# Patient Record
Sex: FEMALE | Race: ASIAN | NOT HISPANIC OR LATINO | URBAN - METROPOLITAN AREA
[De-identification: names, ages, dates, MRNs, and addresses within clinical notes are randomized per-mention and may not be internally consistent; named-entity substitution may affect disease eponyms.]

---

## 2024-09-17 ENCOUNTER — INPATIENT (INPATIENT)
Facility: HOSPITAL | Age: 64
LOS: 0 days | Discharge: HOME | DRG: 392 | End: 2024-09-18
Attending: STUDENT IN AN ORGANIZED HEALTH CARE EDUCATION/TRAINING PROGRAM | Admitting: STUDENT IN AN ORGANIZED HEALTH CARE EDUCATION/TRAINING PROGRAM
Payer: COMMERCIAL

## 2024-09-17 VITALS
HEART RATE: 81 BPM | TEMPERATURE: 98 F | DIASTOLIC BLOOD PRESSURE: 69 MMHG | WEIGHT: 149.91 LBS | SYSTOLIC BLOOD PRESSURE: 111 MMHG | RESPIRATION RATE: 18 BRPM | OXYGEN SATURATION: 94 %

## 2024-09-17 DIAGNOSIS — Z98.890 OTHER SPECIFIED POSTPROCEDURAL STATES: Chronic | ICD-10-CM

## 2024-09-17 DIAGNOSIS — E89.0 POSTPROCEDURAL HYPOTHYROIDISM: Chronic | ICD-10-CM

## 2024-09-17 DIAGNOSIS — Z90.49 ACQUIRED ABSENCE OF OTHER SPECIFIED PARTS OF DIGESTIVE TRACT: Chronic | ICD-10-CM

## 2024-09-17 LAB
ALBUMIN SERPL ELPH-MCNC: 3.7 G/DL — SIGNIFICANT CHANGE UP (ref 3.4–5)
ALP SERPL-CCNC: 57 U/L — SIGNIFICANT CHANGE UP (ref 40–120)
ALT FLD-CCNC: 26 U/L — SIGNIFICANT CHANGE UP (ref 12–42)
ANION GAP SERPL CALC-SCNC: 9 MMOL/L — SIGNIFICANT CHANGE UP (ref 9–16)
AST SERPL-CCNC: 13 U/L — LOW (ref 15–37)
BASOPHILS # BLD AUTO: 0.04 K/UL — SIGNIFICANT CHANGE UP (ref 0–0.2)
BASOPHILS NFR BLD AUTO: 0.3 % — SIGNIFICANT CHANGE UP (ref 0–2)
BILIRUB SERPL-MCNC: 0.4 MG/DL — SIGNIFICANT CHANGE UP (ref 0.2–1.2)
BUN SERPL-MCNC: 20 MG/DL — SIGNIFICANT CHANGE UP (ref 7–23)
CALCIUM SERPL-MCNC: 8.5 MG/DL — SIGNIFICANT CHANGE UP (ref 8.5–10.5)
CHLORIDE SERPL-SCNC: 106 MMOL/L — SIGNIFICANT CHANGE UP (ref 96–108)
CO2 SERPL-SCNC: 26 MMOL/L — SIGNIFICANT CHANGE UP (ref 22–31)
CREAT SERPL-MCNC: 0.64 MG/DL — SIGNIFICANT CHANGE UP (ref 0.5–1.3)
EGFR: 98 ML/MIN/1.73M2 — SIGNIFICANT CHANGE UP
EOSINOPHIL # BLD AUTO: 0.02 K/UL — SIGNIFICANT CHANGE UP (ref 0–0.5)
EOSINOPHIL NFR BLD AUTO: 0.2 % — SIGNIFICANT CHANGE UP (ref 0–6)
GLUCOSE SERPL-MCNC: 137 MG/DL — HIGH (ref 70–99)
HCT VFR BLD CALC: 45.3 % — HIGH (ref 34.5–45)
HGB BLD-MCNC: 15 G/DL — SIGNIFICANT CHANGE UP (ref 11.5–15.5)
IMM GRANULOCYTES NFR BLD AUTO: 0.5 % — SIGNIFICANT CHANGE UP (ref 0–0.9)
LIDOCAIN IGE QN: 29 U/L — SIGNIFICANT CHANGE UP (ref 16–77)
LYMPHOCYTES # BLD AUTO: 1.07 K/UL — SIGNIFICANT CHANGE UP (ref 1–3.3)
LYMPHOCYTES # BLD AUTO: 8.1 % — LOW (ref 13–44)
MCHC RBC-ENTMCNC: 30.5 PG — SIGNIFICANT CHANGE UP (ref 27–34)
MCHC RBC-ENTMCNC: 33.1 GM/DL — SIGNIFICANT CHANGE UP (ref 32–36)
MCV RBC AUTO: 92.3 FL — SIGNIFICANT CHANGE UP (ref 80–100)
MONOCYTES # BLD AUTO: 0.37 K/UL — SIGNIFICANT CHANGE UP (ref 0–0.9)
MONOCYTES NFR BLD AUTO: 2.8 % — SIGNIFICANT CHANGE UP (ref 2–14)
NEUTROPHILS # BLD AUTO: 11.72 K/UL — HIGH (ref 1.8–7.4)
NEUTROPHILS NFR BLD AUTO: 88.1 % — HIGH (ref 43–77)
NRBC # BLD: 0 /100 WBCS — SIGNIFICANT CHANGE UP (ref 0–0)
PLATELET # BLD AUTO: 176 K/UL — SIGNIFICANT CHANGE UP (ref 150–400)
POTASSIUM SERPL-MCNC: 4.3 MMOL/L — SIGNIFICANT CHANGE UP (ref 3.5–5.3)
POTASSIUM SERPL-SCNC: 4.3 MMOL/L — SIGNIFICANT CHANGE UP (ref 3.5–5.3)
PROT SERPL-MCNC: 7.1 G/DL — SIGNIFICANT CHANGE UP (ref 6.4–8.2)
RBC # BLD: 4.91 M/UL — SIGNIFICANT CHANGE UP (ref 3.8–5.2)
RBC # FLD: 11.5 % — SIGNIFICANT CHANGE UP (ref 10.3–14.5)
SODIUM SERPL-SCNC: 141 MMOL/L — SIGNIFICANT CHANGE UP (ref 132–145)
TROPONIN I, HIGH SENSITIVITY RESULT: <4 NG/L — SIGNIFICANT CHANGE UP
WBC # BLD: 13.29 K/UL — HIGH (ref 3.8–10.5)
WBC # FLD AUTO: 13.29 K/UL — HIGH (ref 3.8–10.5)

## 2024-09-17 PROCEDURE — 70450 CT HEAD/BRAIN W/O DYE: CPT | Mod: 26,MC

## 2024-09-17 PROCEDURE — 74177 CT ABD & PELVIS W/CONTRAST: CPT | Mod: 26,MC

## 2024-09-17 PROCEDURE — 99285 EMERGENCY DEPT VISIT HI MDM: CPT

## 2024-09-17 PROCEDURE — 73030 X-RAY EXAM OF SHOULDER: CPT | Mod: 26,RT

## 2024-09-17 PROCEDURE — 71045 X-RAY EXAM CHEST 1 VIEW: CPT | Mod: 26

## 2024-09-17 RX ORDER — METRONIDAZOLE 250 MG
500 TABLET ORAL ONCE
Refills: 0 | Status: COMPLETED | OUTPATIENT
Start: 2024-09-17 | End: 2024-09-17

## 2024-09-17 RX ORDER — ACETAMINOPHEN 325 MG/1
650 TABLET ORAL ONCE
Refills: 0 | Status: COMPLETED | OUTPATIENT
Start: 2024-09-17 | End: 2024-09-17

## 2024-09-17 RX ORDER — SODIUM CHLORIDE 9 MG/ML
1000 INJECTION INTRAMUSCULAR; INTRAVENOUS; SUBCUTANEOUS ONCE
Refills: 0 | Status: COMPLETED | OUTPATIENT
Start: 2024-09-17 | End: 2024-09-17

## 2024-09-17 RX ORDER — KETOROLAC TROMETHAMINE 30 MG/ML
15 INJECTION, SOLUTION INTRAMUSCULAR ONCE
Refills: 0 | Status: DISCONTINUED | OUTPATIENT
Start: 2024-09-17 | End: 2024-09-17

## 2024-09-17 RX ORDER — ONDANSETRON 2 MG/ML
4 INJECTION, SOLUTION INTRAMUSCULAR; INTRAVENOUS ONCE
Refills: 0 | Status: COMPLETED | OUTPATIENT
Start: 2024-09-17 | End: 2024-09-17

## 2024-09-17 RX ORDER — CEFPODOXIME PROXETIL 100 MG/5ML
1 GRANULE, FOR SUSPENSION ORAL
Qty: 14 | Refills: 0
Start: 2024-09-17 | End: 2024-09-23

## 2024-09-17 RX ORDER — SODIUM CHLORIDE 9 MG/ML
1000 INJECTION INTRAMUSCULAR; INTRAVENOUS; SUBCUTANEOUS
Refills: 0 | Status: DISCONTINUED | OUTPATIENT
Start: 2024-09-17 | End: 2024-09-18

## 2024-09-17 RX ORDER — METRONIDAZOLE 250 MG
1 TABLET ORAL
Qty: 21 | Refills: 0
Start: 2024-09-17 | End: 2024-09-23

## 2024-09-17 RX ORDER — ACETAMINOPHEN 325 MG/1
2 TABLET ORAL
Qty: 40 | Refills: 0
Start: 2024-09-17

## 2024-09-17 RX ADMIN — SODIUM CHLORIDE 1000 MILLILITER(S): 9 INJECTION INTRAMUSCULAR; INTRAVENOUS; SUBCUTANEOUS at 14:51

## 2024-09-17 RX ADMIN — SODIUM CHLORIDE 1000 MILLILITER(S): 9 INJECTION INTRAMUSCULAR; INTRAVENOUS; SUBCUTANEOUS at 17:48

## 2024-09-17 RX ADMIN — Medication 4 MILLIGRAM(S): at 14:52

## 2024-09-17 RX ADMIN — Medication 100 MILLIGRAM(S): at 17:48

## 2024-09-17 RX ADMIN — KETOROLAC TROMETHAMINE 15 MILLIGRAM(S): 30 INJECTION, SOLUTION INTRAMUSCULAR at 21:06

## 2024-09-17 RX ADMIN — SODIUM CHLORIDE 150 MILLILITER(S): 9 INJECTION INTRAMUSCULAR; INTRAVENOUS; SUBCUTANEOUS at 21:05

## 2024-09-17 RX ADMIN — ONDANSETRON 4 MILLIGRAM(S): 2 INJECTION, SOLUTION INTRAMUSCULAR; INTRAVENOUS at 20:30

## 2024-09-17 RX ADMIN — Medication 100 MILLIGRAM(S): at 18:11

## 2024-09-17 NOTE — ED PROVIDER NOTE - OBJECTIVE STATEMENT
She reports abdominal pain, nausea, vomiting since this morning.  Pain is worse in the left lower quadrant.  Vomit is nonbloody nonbilious.  Diarrhea is nonbloody.  Pain became severe before she called 911 and she felt like he was going to pass out.  Also having a right-sided headache since the vomiting started.  Also has chronic pain in her right shoulder that has been worse for the past few weeks and worse today.  No fever, chest pain, shortness of breath, dysuria, focal weakness, paresthesias, injury.

## 2024-09-17 NOTE — ED PROVIDER NOTE - PROGRESS NOTE DETAILS
Patient resting comfortably, feels markedly improved. TOlerated PO. Abdominal pain much better. Shoulder still hurts. WIll give tyelnol. To f/u with PMD in 1-2 days, Return to the ED immediately if getting worse, not improving, or if having any new or troubling symptoms. Patient resting comfortably, feels mildly improved. Possible colitis seen on CT fits clinical picture. will tx with antibiotics. After taking tylenol, patient started vomiting. Not safe to discharge since she cannot tolerate pills. Will admit for IV antibiotics. After taking tylenol, patient started vomiting. Not safe to discharge since she cannot tolerate pills. Will admit for IV antibiotics. I consulted with Dr. De Luna, hospitalist attending, who accepted patient to [facility] medicine.
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
## 2024-09-17 NOTE — ED PROVIDER NOTE - PATIENT PORTAL LINK FT
You can access the FollowMyHealth Patient Portal offered by [facility] by registering at the following website: [email or web address]. By joining [employer]’s FollowMyHealth portal, you will also be able to view your health information using other applications (apps) compatible with our system.

## 2024-09-17 NOTE — ED PROVIDER NOTE - CLINICAL SUMMARY MEDICAL DECISION MAKING FREE TEXT BOX
Patient With abdominal pain, nausea, vomiting and diarrhea.  Also with chronic right shoulder pain that is worse today.  Also headache today. Will get labs, CT head, CT abdomen pelvis, shoulder x-ray, reassess.

## 2024-09-17 NOTE — ED PROVIDER NOTE - NSICDXPASTSURGICALHX_GEN_ALL_CORE_FT
PAST SURGICAL HISTORY:  S/P appendectomy     S/P lumpectomy, right breast     S/P partial thyroidectomy

## 2024-09-17 NOTE — ED ADULT NURSE NOTE - NSFALLRISKINTERV_ED_ALL_ED
Assistance OOB with selected safe patient handling equipment if applicable/Assistance with ambulation/Communicate fall risk and risk factors to all staff, patient, and family/Encourage patient to sit up slowly, dangle for a short time, stand at bedside before walking/Orthostatic vital signs/Provide visual cue: yellow wristband, yellow gown, etc/Reinforce activity limits and safety measures with patient and family/Review medications for side effects contributing to fall risk/Toileting schedule using arm’s reach rule for commode and bathroom/Call bell, personal items and telephone in reach/Instruct patient to call for assistance before getting out of bed/chair/stretcher/Non-slip footwear applied when patient is off stretcher/[place] to call system/Physically safe environment - no spills, clutter or unnecessary equipment/Purposeful Proactive Rounding/Room/bathroom lighting operational, light cord in reach

## 2024-09-17 NOTE — ED ADULT NURSE NOTE - OBJECTIVE STATEMENT
Patient reports lower ab pain associated with n/v and diarrhea. Also c/o left upper back pain and HA. REsting on left side because increased pain when she lies flat

## 2024-09-17 NOTE — ED PROVIDER NOTE - PHYSICAL EXAMINATION
Gen: Mild distress due to pain. HEENT: NCAT, mmm   Chest: RRR, nl S1 and S2, no m/r/g. Resp: CTAB, no w/r/r  Abd: nl BS, soft, Mildly diffusely tender, moderate left lower quadrant tenderness, no G/R. Nondistended. Ext: Warm, dry. Right shoulder anterior tenderness.  Full range of motion.  No effusion.  Neuro: CN II-XII intact, normal and equal strength, sensation, and reflexes bilaterally, speech clear  Psych: AAOx3

## 2024-09-17 NOTE — ED PROVIDER NOTE - NSICDXPASTMEDICALHX_GEN_ALL_CORE_FT
PAST MEDICAL HISTORY:  Breast cancer s/p right lumpectomy 2021    Diabetes mellitus     History of asthma     Hypertension

## 2024-09-17 NOTE — ED PROVIDER NOTE - NSFOLLOWUPINSTRUCTIONS_ED_ALL_ED_FT
Infectious Colitis    WHAT YOU NEED TO KNOW:    Infectious colitis is swelling and irritation of your colon caused by bacteria, parasites, or viruses.    DISCHARGE INSTRUCTIONS:    Return to the emergency department if:    You are urinating less than normal or not at all.    You have a headache, dizziness, or confusion.    You have irregular or fast breathing or a fast or pounding heartbeat.    You suddenly lose weight without trying.  Call your doctor if:    You are more tired than usual or weak.    Your symptoms last for more than 30 days.    You have questions or concerns about your condition or care.  Medicines:    Medicines may be given to treat the bacteria, virus, or parasite that is causing your infectious colitis.    Take your medicine as directed. Contact your healthcare provider if you think your medicine is not helping or if you have side effects. Tell your provider if you are allergic to any medicine. Keep a list of the medicines, vitamins, and herbs you take. Include the amounts, and when and why you take them. Bring the list or the pill bottles to follow-up visits. Carry your medicine list with you in case of an emergency.  Self-care:    Drink liquids to help prevent dehydration. Ask your healthcare provider how much liquid to drink each day and which liquids are best for you. You may need to drink an oral rehydration solution (ORS). An ORS contains a balance of water, salt, and sugar to replace body fluids lost during diarrhea. Ask what kind of ORS to use, how much to drink, and where to get it.    Do not take medicine to stop your diarrhea. These medicines may make your symptoms last longer.  Prevent infectious colitis:    Clean food and utensils thoroughly. Rinse fruits and vegetables in running water. Clean cutting boards, knives, countertops, and other areas where you prepare food before and after you cook. Wash sponges and dishtowels weekly in hot water.    Keep cooked and raw foods separate in your grocery cart, grocery bags, and refrigerator. This prevents cross contamination. Cross contamination is when germs from one food spread to another food. This happens when juices from raw meat, fish, and eggs get on cooked or ready-to-eat foods. Use a separate cutting board for raw foods. Never put cooked food on an unwashed plate that had raw meat, seafood, or eggs on it.    Cook meat as directed.  Cook ground meat to 160°F.    Cook ground poultry, whole poultry, or cuts of poultry to at least 165°F. Remove the meat from heat. Let it stand for 3 minutes before you eat it.    Cook whole cuts of meat other than poultry to at least 145°F. Remove the meat from heat. Let it stand for 3 minutes before you eat it.    Do not eat raw or undercooked oysters, clams, or mussels. These foods may be contaminated and cause infection.    Refrigerate food immediately. This will help slow down the growth of germs. Your refrigerator should be at 40°F or below to keep foods safe. Put meat, poultry, eggs, and seafood in the refrigerator or freezer within 2 hours after cooking or buying them. Always thaw food in the refrigerator, cold water, or microwave. Do not thaw food on your countertop.    Drink safe water. Drink only treated water. Do not drink water from ponds or lakes, or swimming pools. Drink bottled water when traveling.  Prevent the spread of germs:      Wash your hands often. Wash your hands several times each day. Wash after you use the bathroom, change a child's diaper, and before you prepare or eat food. Use soap and water every time. Rub your soapy hands together, lacing your fingers. Wash the front and back of your hands, and in between your fingers. Use the fingers of one hand to scrub under the fingernails of the other hand. Wash for at least 20 seconds. Rinse with warm, running water for several seconds. Then dry your hands with a clean towel or paper towel. Use hand  that contains alcohol if soap and water are not available. Do not touch your eyes, nose, or mouth without washing your hands first.  Handwashing      Cover a sneeze or cough. Use a tissue that covers your mouth and nose. Throw the tissue away in a trash can right away. Use the bend of your arm if a tissue is not available. Wash your hands well with soap and water or use a hand .    Stay away from others while you are sick. Avoid crowds as much as possible.    Ask about vaccines you may need. Talk to your healthcare provider about your vaccine history. He or she will tell you which vaccines you need, and when to get them.  Get the influenza (flu) vaccine as soon as recommended each year. The flu vaccine is available starting in September or October. Flu viruses change, so it is important to get a flu vaccine every year.    Get the pneumonia vaccine if recommended. This vaccine is usually recommended every 5 years. Your provider will tell you when to get this vaccine, if needed.  Follow up with your doctor as directed: Write down your questions so you remember to ask them during your visits.    Osteoarthritis  A knee joint, showing the effects of osteoarthritis on the cartilage.   Osteoarthritis is a type of arthritis. It refers to joint pain or joint disease. Osteoarthritis affects tissue that covers the ends of bones in joints (cartilage). Cartilage acts as a cushion between the bones and helps them move smoothly. Osteoarthritis occurs when cartilage in the joints gets worn down. Osteoarthritis is sometimes called "wear and tear" arthritis.    Osteoarthritis is the most common form of arthritis. It often occurs in older people. It is a condition that gets worse over time. The joints most often affected by this condition are in the fingers, toes, hips, knees, and spine, including the neck and lower back.    What are the causes?  This condition is caused by the wearing down of cartilage that covers the ends of bones.    What increases the risk?  The following factors may make you more likely to develop this condition:  Being age 50 or older.  Obesity.  Overuse of joints.  Past injury of a joint.  Past surgery on a joint.  Family history of osteoarthritis.  What are the signs or symptoms?  The main symptoms of this condition are pain, swelling, and stiffness in the joint. Other symptoms may include:  An enlarged joint.  More pain and further damage caused by small pieces of bone or cartilage that break off and float inside of the joint.  Small deposits of bone (osteophytes) that grow on the edges of the joint.  A grating or scraping feeling inside the joint when you move it.  Popping or creaking sounds when you move.  Difficulty walking or exercising.  An inability to  items, twist your hand, or control the movements of your hands and fingers.  How is this diagnosed?  This condition may be diagnosed based on:  Your medical history.  A physical exam.  Your symptoms.  X-rays of the affected joints.  Blood tests to rule out other types of arthritis.  How is this treated?  There is no cure for this condition, but treatment can help control pain and improve joint function. Treatment may include a combination of therapies, such as:  Pain relief techniques, such as:  Applying heat and cold to the joint.  Massage.  A form of talk therapy called cognitive behavioral therapy (CBT). This therapy helps you set goals and follow up on the changes that you make.  Medicines for pain and inflammation. The medicines can be taken by mouth or applied to the skin. They include:  NSAIDs, such as ibuprofen.  Prescription medicines.  Strong anti-inflammatory medicines (corticosteroids).  Certain nutritional supplements.  A prescribed exercise program. You may work with a physical therapist.  Assistive devices, such as a brace, wrap, splint, specialized glove, or cane.  A weight control plan.  Surgery, such as:  An osteotomy. This is done to reposition the bones and relieve pain or to remove loose pieces of bone and cartilage.  Joint replacement surgery. You may need this surgery if you have advanced osteoarthritis.  Follow these instructions at home:  Activity    Rest your affected joints as told by your health care provider.  Exercise as told by your provider. The provider may recommend specific types of exercise, such as:  Strengthening exercises. These are done to strengthen the muscles that support joints affected by arthritis.  Aerobic activities. These are exercises, such as brisk walking or water aerobics, that increase your heart rate.  Range-of-motion activities. These help your joints move more easily.  Balance and agility exercises.  Managing pain, stiffness, and swelling    A heating pad being used on the affected joint.  Bag of ice on a towel on the skin.  If told, apply heat to the affected area as often as told by your provider. Use the heat source that your provider recommends, such as a moist heat pack or a heating pad.  If you have a removable assistive device, remove it as told by your provider.  Place a towel between your skin and the heat source. If your provider tells you to keep the assistive device on while you apply heat, place a towel between the assistive device and the heat source.  Leave the heat on for 20–30 minutes.  If told, put ice on the affected area.  If you have a removable assistive device, remove it as told by your provider.  Put ice in a plastic bag.  Place a towel between your skin and the bag. If your provider tells you to keep the assistive device on during icing, place a towel between the assistive device and the bag.  Leave the ice on for 20 minutes, 2–3 times a day.  If your skin turns bright red, remove the ice or heat right away to prevent skin damage. The risk of damage is higher if you cannot feel pain, heat, or cold.  Move your fingers or toes often to reduce stiffness and swelling.  Raise (elevate) the affected area above the level of your heart while you are sitting or lying down.  General instructions    Take over-the-counter and prescription medicines only as told by your provider.  Maintain a healthy weight. Follow instructions from your provider for weight control.  Do not use any products that contain nicotine or tobacco. These products include cigarettes, chewing tobacco, and vaping devices, such as e-cigarettes. If you need help quitting, ask your provider.  Use assistive devices as told by your provider.  Where to find more information  National [place] of Arthritis and Musculoskeletal and Skin Diseases: niams.nih.gov  National [place] on Aging: chay.nih.gov  American College of Rheumatology: rheumatology.org  Contact a health care provider if:  You have redness, swelling, or a feeling of warmth in a joint that gets worse.  You have a fever along with joint or muscle aches.  You develop a rash.  You have trouble doing your normal activities.  You have pain that gets worse and is not relieved by pain medicine.  This information is not intended to replace advice given to you by your health care provider. Make sure you discuss any questions you have with your health care provider.

## 2024-09-18 VITALS
SYSTOLIC BLOOD PRESSURE: 110 MMHG | OXYGEN SATURATION: 97 % | TEMPERATURE: 98 F | DIASTOLIC BLOOD PRESSURE: 69 MMHG | RESPIRATION RATE: 17 BRPM | HEART RATE: 60 BPM

## 2024-09-18 DIAGNOSIS — S09.90XA UNSPECIFIED INJURY OF HEAD, INITIAL ENCOUNTER: ICD-10-CM

## 2024-09-18 DIAGNOSIS — Z29.9 ENCOUNTER FOR PROPHYLACTIC MEASURES, UNSPECIFIED: ICD-10-CM

## 2024-09-18 DIAGNOSIS — R10.9 UNSPECIFIED ABDOMINAL PAIN: ICD-10-CM

## 2024-09-18 DIAGNOSIS — E11.9 TYPE 2 DIABETES MELLITUS WITHOUT COMPLICATIONS: ICD-10-CM

## 2024-09-18 LAB
A1C WITH ESTIMATED AVERAGE GLUCOSE RESULT: 7 % — HIGH (ref 4–5.6)
ALBUMIN SERPL ELPH-MCNC: 3.4 G/DL — SIGNIFICANT CHANGE UP (ref 3.3–5)
ALP SERPL-CCNC: 46 U/L — SIGNIFICANT CHANGE UP (ref 40–120)
ALT FLD-CCNC: 12 U/L — SIGNIFICANT CHANGE UP (ref 10–45)
ANION GAP SERPL CALC-SCNC: 6 MMOL/L — SIGNIFICANT CHANGE UP (ref 5–17)
AST SERPL-CCNC: 10 U/L — SIGNIFICANT CHANGE UP (ref 10–40)
BASOPHILS # BLD AUTO: 0.02 K/UL — SIGNIFICANT CHANGE UP (ref 0–0.2)
BASOPHILS NFR BLD AUTO: 0.4 % — SIGNIFICANT CHANGE UP (ref 0–2)
BILIRUB SERPL-MCNC: 0.5 MG/DL — SIGNIFICANT CHANGE UP (ref 0.2–1.2)
BLD GP AB SCN SERPL QL: NEGATIVE — SIGNIFICANT CHANGE UP
BUN SERPL-MCNC: 11 MG/DL — SIGNIFICANT CHANGE UP (ref 7–23)
CALCIUM SERPL-MCNC: 8.2 MG/DL — LOW (ref 8.4–10.5)
CHLORIDE SERPL-SCNC: 108 MMOL/L — SIGNIFICANT CHANGE UP (ref 96–108)
CO2 SERPL-SCNC: 26 MMOL/L — SIGNIFICANT CHANGE UP (ref 22–31)
CREAT SERPL-MCNC: 0.63 MG/DL — SIGNIFICANT CHANGE UP (ref 0.5–1.3)
EGFR: 98 ML/MIN/1.73M2 — SIGNIFICANT CHANGE UP
EOSINOPHIL # BLD AUTO: 0.07 K/UL — SIGNIFICANT CHANGE UP (ref 0–0.5)
EOSINOPHIL NFR BLD AUTO: 1.4 % — SIGNIFICANT CHANGE UP (ref 0–6)
ESTIMATED AVERAGE GLUCOSE: 154 MG/DL — HIGH (ref 68–114)
GLUCOSE BLDC GLUCOMTR-MCNC: 104 MG/DL — HIGH (ref 70–99)
GLUCOSE BLDC GLUCOMTR-MCNC: 130 MG/DL — HIGH (ref 70–99)
GLUCOSE SERPL-MCNC: 107 MG/DL — HIGH (ref 70–99)
HCT VFR BLD CALC: 40.7 % — SIGNIFICANT CHANGE UP (ref 34.5–45)
HGB BLD-MCNC: 13.1 G/DL — SIGNIFICANT CHANGE UP (ref 11.5–15.5)
IMM GRANULOCYTES NFR BLD AUTO: 0.2 % — SIGNIFICANT CHANGE UP (ref 0–0.9)
LACTATE SERPL-SCNC: 0.9 MMOL/L — SIGNIFICANT CHANGE UP (ref 0.5–2)
LYMPHOCYTES # BLD AUTO: 1.5 K/UL — SIGNIFICANT CHANGE UP (ref 1–3.3)
LYMPHOCYTES # BLD AUTO: 30.1 % — SIGNIFICANT CHANGE UP (ref 13–44)
MAGNESIUM SERPL-MCNC: 2.1 MG/DL — SIGNIFICANT CHANGE UP (ref 1.6–2.6)
MCHC RBC-ENTMCNC: 30.5 PG — SIGNIFICANT CHANGE UP (ref 27–34)
MCHC RBC-ENTMCNC: 32.2 GM/DL — SIGNIFICANT CHANGE UP (ref 32–36)
MCV RBC AUTO: 94.9 FL — SIGNIFICANT CHANGE UP (ref 80–100)
MONOCYTES # BLD AUTO: 0.29 K/UL — SIGNIFICANT CHANGE UP (ref 0–0.9)
MONOCYTES NFR BLD AUTO: 5.8 % — SIGNIFICANT CHANGE UP (ref 2–14)
NEUTROPHILS # BLD AUTO: 3.1 K/UL — SIGNIFICANT CHANGE UP (ref 1.8–7.4)
NEUTROPHILS NFR BLD AUTO: 62.1 % — SIGNIFICANT CHANGE UP (ref 43–77)
NRBC # BLD: 0 /100 WBCS — SIGNIFICANT CHANGE UP (ref 0–0)
PHOSPHATE SERPL-MCNC: 3.9 MG/DL — SIGNIFICANT CHANGE UP (ref 2.5–4.5)
PLATELET # BLD AUTO: 143 K/UL — LOW (ref 150–400)
POTASSIUM SERPL-MCNC: 3.6 MMOL/L — SIGNIFICANT CHANGE UP (ref 3.5–5.3)
POTASSIUM SERPL-SCNC: 3.6 MMOL/L — SIGNIFICANT CHANGE UP (ref 3.5–5.3)
PROT SERPL-MCNC: 5.7 G/DL — LOW (ref 6–8.3)
RBC # BLD: 4.29 M/UL — SIGNIFICANT CHANGE UP (ref 3.8–5.2)
RBC # FLD: 11.9 % — SIGNIFICANT CHANGE UP (ref 10.3–14.5)
RH IG SCN BLD-IMP: POSITIVE — SIGNIFICANT CHANGE UP
SODIUM SERPL-SCNC: 140 MMOL/L — SIGNIFICANT CHANGE UP (ref 135–145)
WBC # BLD: 4.99 K/UL — SIGNIFICANT CHANGE UP (ref 3.8–10.5)
WBC # FLD AUTO: 4.99 K/UL — SIGNIFICANT CHANGE UP (ref 3.8–10.5)

## 2024-09-18 PROCEDURE — 99236 HOSP IP/OBS SAME DATE HI 85: CPT | Mod: GC

## 2024-09-18 RX ORDER — ONDANSETRON 2 MG/ML
1 INJECTION, SOLUTION INTRAMUSCULAR; INTRAVENOUS
Qty: 1 | Refills: 0
Start: 2024-09-18 | End: 2024-09-22

## 2024-09-18 RX ORDER — FLU VACCINE TS 2012-2013(5YR+) 45MCG/.5ML
0.5 VIAL (ML) INTRAMUSCULAR ONCE
Refills: 0 | Status: DISCONTINUED | OUTPATIENT
Start: 2024-09-18 | End: 2024-09-18

## 2024-09-18 RX ORDER — ACETAMINOPHEN 325 MG/1
650 TABLET ORAL EVERY 6 HOURS
Refills: 0 | Status: DISCONTINUED | OUTPATIENT
Start: 2024-09-18 | End: 2024-09-18

## 2024-09-18 RX ORDER — DAPAGLIFLOZIN 10 MG/1
1 TABLET, FILM COATED ORAL
Qty: 0 | Refills: 0 | DISCHARGE

## 2024-09-18 RX ORDER — SENNA 187 MG
1 TABLET ORAL
Qty: 0 | Refills: 0 | DISCHARGE

## 2024-09-18 RX ORDER — METRONIDAZOLE 250 MG
500 TABLET ORAL EVERY 12 HOURS
Refills: 0 | Status: DISCONTINUED | OUTPATIENT
Start: 2024-09-18 | End: 2024-09-18

## 2024-09-18 RX ORDER — METFORMIN HYDROCHLORIDE 850 MG/1
1 TABLET, FILM COATED ORAL
Qty: 0 | Refills: 0 | DISCHARGE

## 2024-09-18 RX ORDER — AZITHROMYCIN 500 MG/1
1 TABLET, FILM COATED ORAL
Qty: 2 | Refills: 0
Start: 2024-09-18 | End: 2024-09-19

## 2024-09-18 RX ORDER — ENOXAPARIN SODIUM 100 MG/ML
40 INJECTION SUBCUTANEOUS EVERY 24 HOURS
Refills: 0 | Status: DISCONTINUED | OUTPATIENT
Start: 2024-09-18 | End: 2024-09-18

## 2024-09-18 RX ORDER — ONDANSETRON 2 MG/ML
4 INJECTION, SOLUTION INTRAMUSCULAR; INTRAVENOUS EVERY 8 HOURS
Refills: 0 | Status: DISCONTINUED | OUTPATIENT
Start: 2024-09-18 | End: 2024-09-18

## 2024-09-18 RX ORDER — AZITHROMYCIN 500 MG/1
500 TABLET, FILM COATED ORAL ONCE
Refills: 0 | Status: COMPLETED | OUTPATIENT
Start: 2024-09-18 | End: 2024-09-18

## 2024-09-18 RX ADMIN — AZITHROMYCIN 500 MILLIGRAM(S): 500 TABLET, FILM COATED ORAL at 12:31

## 2024-09-18 RX ADMIN — Medication 3 MILLIGRAM(S): at 02:44

## 2024-09-18 RX ADMIN — Medication 110 MILLILITER(S): at 03:14

## 2024-09-18 RX ADMIN — ENOXAPARIN SODIUM 40 MILLIGRAM(S): 100 INJECTION SUBCUTANEOUS at 07:11

## 2024-09-18 NOTE — H&P ADULT - NSHPPHYSICALEXAM_GEN_ALL_CORE
VITAL SIGNS:  T(C): 36.4 (09-18-24 @ 01:37), Max: 36.8 (09-17-24 @ 17:11)  T(F): 97.6 (09-18-24 @ 01:37), Max: 98.2 (09-17-24 @ 17:11)  HR: 60 (09-18-24 @ 01:37) (57 - 81)  BP: 123/66 (09-18-24 @ 01:37) (111/69 - 167/83)  BP(mean): --  RR: 17 (09-18-24 @ 01:37) (16 - 18)  SpO2: 96% (09-18-24 @ 01:37) (94% - 98%)  Wt(kg): --    PHYSICAL EXAM:    Constitutional: resting comfortably in bed; NAD  Head: NC/AT  Eyes: PERRL, EOMI, anicteric sclera  ENT: no nasal discharge; uvula midline, no oropharyngeal erythema or exudates; dry mucous membranes   Neck: supple; no JVD or thyromegaly  Respiratory: CTA B/L; no W/R/R, no retractions  Cardiac: +S1/S2; RRR; no M/R/G  Gastrointestinal: generalized TTP, abdomen soft, no rebound or guarding; +BSx4  Extremities: WWP, no clubbing or cyanosis; no peripheral edema  Musculoskeletal: NROM x4; no joint swelling, tenderness or erythema  Vascular: 2+ radial, DP/PT pulses B/L  Dermatologic: skin warm, dry and intact; no rashes, wounds, or scars  Neurologic: AAOx3; CNII-XII grossly intact; no focal deficits

## 2024-09-18 NOTE — H&P ADULT - PROBLEM SELECTOR PLAN 4
F: LR @ 110 cc/hr  E: replete as needed, K > 4, Phos > 3, Mg > 2  DVT: Lovenox F: LR @ 110 cc/hr  E: replete as needed, K > 4, Phos > 3, Mg > 2  N: npo  DVT: Lovenox
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
## 2024-09-18 NOTE — PATIENT PROFILE ADULT - FALL HARM RISK - FACTORS
Likely multifactorial - ROBB, C-diff, Multiple myeloma - given thrombocytopenia - would recommend head CT given acute nature of mental status change.  Endorsed to MICU team.  Continue management as per MICU Dramatic rise in cr noted this morning. Baseline cr ~1.1.  No evidence of obstruction on renal US.  Poor PO intake, with occasional doses of NSAID and diuretics, and high vanc trough. No recent contrast exposure.   Can recheck UA in setting of new ROBB, althought previous UA relatively bland.   Agree with gentle hydration. No evidence of TMA, or hemolytic anemia.  Adjust meds of egfr <20. I performed a history and physical exam of the patient and discussed  the findings and plan with the house officer. I reviewed the resident note and agree with the findings and plan Weakness

## 2024-09-18 NOTE — DISCHARGE NOTE PROVIDER - ATTENDING DISCHARGE PHYSICAL EXAMINATION:
Gen: sitting upright in bed at time of exam  HEENT: NCAT, MMM, clear OP  Neck: supple, trachea at midline  CV: RRR, +S1/S2  Pulm: adequate respiratory effort, no increased work of breathing  Abd: soft, NTND  Skin: warm and dry, no new rashes vs prior report  Ext: WWP  Neuro: AOx3, no gross focal neurological deficits  Psych: affect and behavior appropriate Gen: sitting upright in bed at time of exam  HEENT: NCAT, MMM, clear OP  Neck: supple, trachea at midline  CV: RRR, +S1/S2  Pulm: adequate respiratory effort, no increased work of breathing  Abd: soft, NTND  Skin: warm and dry, no new rashes vs prior report  Ext: WWP.  Neuro: AOx3, no gross focal neurological deficits  Psych: affect and behavior appropriate

## 2024-09-18 NOTE — H&P ADULT - NSHPLABSRESULTS_GEN_ALL_CORE
15.0   13.29 )-----------( 176      ( 17 Sep 2024 14:26 )             45.3       09-17    141  |  106  |  20  ----------------------------<  137[H]  4.3   |  26  |  0.64    Ca    8.5      17 Sep 2024 14:26    TPro  7.1  /  Alb  3.7  /  TBili  0.4  /  DBili  x   /  AST  13[L]  /  ALT  26  /  AlkPhos  57  09-17              Urinalysis Basic - ( 17 Sep 2024 14:26 )    Color: x / Appearance: x / SG: x / pH: x  Gluc: 137 mg/dL / Ketone: x  / Bili: x / Urobili: x   Blood: x / Protein: x / Nitrite: x   Leuk Esterase: x / RBC: x / WBC x   Sq Epi: x / Non Sq Epi: x / Bacteria: x            Lactate Trend            CAPILLARY BLOOD GLUCOSE      POCT Blood Glucose.: 153 mg/dL (17 Sep 2024 14:00)          Radiology: Reviewed, summarized above.

## 2024-09-18 NOTE — H&P ADULT - NSICDXPASTMEDICALHX_GEN_ALL_CORE_FT
PAST MEDICAL HISTORY:  Breast cancer s/p right lumpectomy 2021    Diabetes mellitus     History of asthma

## 2024-09-18 NOTE — H&P ADULT - ASSESSMENT
Patient is a 63 y.o. F with PMHx diabetes, breast cancer s/p lumpectomy (right), asthma presenting with abdominal pain, nausea, nonbilious nonbloody emesis and diarrhea suggestive of infectious colitis

## 2024-09-18 NOTE — H&P ADULT - ATTENDING COMMENTS
Ptn w many episodes of nonbloody watery diarrhea and abd pain as well as NBNB nausea/vomitting which prompted presentation, no recent travel, unsure if she ate something that triggered, abd soft, no rebound or guarding, mild tenderness on deep palpation.  On IVF, tolerating some clear liquids now, no further diarrhea or vomiting since admission, still w some nausea, pending GI PCR if ptn makes sample.  CTAP w possible areas of colonic wall thickening, ptn sp ctx and flagyl w resolution of leukocytosis.  Symptomatically improved now for discharge, ptn counseled on hydration, gentle PO as tolerated, symptom monitoring and to follow up w PCP.  Will give azithro 500mg x3d course for discharge for acute diarrhea w/o dysentery.    See discharge summary for same day time and billing

## 2024-09-18 NOTE — H&P ADULT - PROBLEM SELECTOR PLAN 1
Generalized abdominal pain, most prominent in LLQ. Associated with nonbilious nonbloody emesis still, however hasn't had diarrhea since admission. Has had multiple similar episodes prior that were self limiting.   Her last colonoscopy was 2 years ago, a polyp was removed, denies any hx of ulcerative colitis or Crohn's disease.  CTAP findings without evidence of bowel obstruction. Areas of colonic wall thickening noted in the proximal descending and distal descending/proximal sigmoid colon suggestive of colitis.   - C/w IVF resuscitation: LR @ 110 cc/hr  - Monitor off antibiotics at this time   - f/u GI PCR Generalized abdominal pain, most prominent in LLQ. Associated with nonbilious nonbloody emesis still, however hasn't had diarrhea since admission. Has not met SIRS/sepsis criteria.   Has had multiple similar episodes prior that were self limiting.   Her last colonoscopy was 2 years ago, a polyp was removed, denies any hx of ulcerative colitis or Crohn's disease.  CTAP findings without evidence of bowel obstruction. Areas of colonic wall thickening noted in the proximal descending and distal descending/proximal sigmoid colon suggestive of  infectious colitis.   - C/w IVF resuscitation: LR @ 110 cc/hr  - Monitor off antibiotics at this time   - f/u GI PCR Generalized abdominal pain, most prominent in LLQ. Associated with nonbilious nonbloody emesis still, however hasn't had diarrhea since admission. Has not met SIRS/sepsis criteria.   Has had multiple similar episodes prior that were self limiting.   Her last colonoscopy was 2 years ago, a polyp was removed, denies any hx of ulcerative colitis or Crohn's disease.  CTAP findings without evidence of bowel obstruction. Areas of colonic wall thickening noted in the proximal descending and distal descending/proximal sigmoid colon suggestive of  infectious colitis.   - C/w IVF resuscitation: LR @ 110 cc/hr  - Monitor off antibiotics at this time   - f/u GI PCR  - Zofran PRN Generalized abdominal pain, most prominent in LLQ. Associated with nonbilious nonbloody emesis still, however hasn't had diarrhea since admission. Has not met SIRS/sepsis criteria.   Has had multiple similar episodes prior that were self limiting.   Her last colonoscopy was 2 years ago, a polyp was removed, denies any hx of ulcerative colitis or Crohn's disease.  CTAP findings without evidence of bowel obstruction. Areas of colonic wall thickening noted in the proximal descending and distal descending/proximal sigmoid colon suggestive of  infectious colitis.   - C/w IVF resuscitation: LR @ 110 cc/hr  - Monitor off antibiotics at this time   - f/u GI PCR  - Zofran PRN (last QtC 456) Generalized abdominal pain, most prominent in LLQ. Associated with nonbilious nonbloody emesis still, however hasn't had diarrhea since admission. Has not met SIRS/sepsis criteria.   Has had multiple similar episodes prior that were self limiting.   Her last colonoscopy was 2 years ago, a polyp was removed, denies any hx of ulcerative colitis or Crohn's disease.  CTAP findings without evidence of bowel obstruction. Areas of colonic wall thickening noted in the proximal descending and distal descending/proximal sigmoid colon suggestive of  infectious colitis.   - C/w IVF resuscitation: LR @ 110 cc/hr  - C/w IV Ceftriaxone and Flagyl (9/17- )  - f/u GI PCR  - Zofran PRN (last QtC 456)

## 2024-09-18 NOTE — DISCHARGE NOTE PROVIDER - NSDCCPTREATMENT_GEN_ALL_CORE_FT
PRINCIPAL PROCEDURE  Procedure: CT abdomen  Findings and Treatment: 1.  Possible areas of colonic wall thickening noted particularly in the proximal descending and distal descending/proximal sigmoid colon.   Although this may be due to incomplete distention, a pathologic etiology, of either infectious or inflammatory cause cannot be completely excluded. Clinical correlation  2.  Leiomyomatous uterus

## 2024-09-18 NOTE — DISCHARGE NOTE PROVIDER - CARE PROVIDER_API CALL
Keith Lowe)  Internal Medicine  [address]  [place] [address]  Phone: [phone]  Fax: [phone]  Follow Up Time: 1 week

## 2024-09-18 NOTE — H&P ADULT - HISTORY OF PRESENT ILLNESS
Patient is a 63 y.o. F with PMHx diabetes, breast cancer s/p lumpectomy (right), asthma presenting with nausea, vomiting and diarrhea that began earlier today. She was constipated for 3 days prior to admission. Takes senna PRN at home.  Abdominal pain is generalized, worst in the LLQ, rated 7/10 at max intensity, tends to improve after bowel movements. She denies any sick contacts, recent travel hx.   Has had multiple similar episodes prior. Self limiting.   Her last colonoscopy was 2 years ago, a polyp was removed, denies any hx of ulcerative colitis or Crohn's disease.     ED course:   VS: T 97.6, HR 81, /69, RR 18, SpO2 94%  Labs: significant for mild leukocytosis of 13, otherwise unremarkable (liver enzymes, lipase, trop, hb/hct all wnl)  CT head: No acute hemorrhage, mass effect or midline shift.  CTAP: No bowel obstruction. Possible areas of colonic wall thickening noted particularly in the proximal descending and distal descending/proximal sigmoid colon. Leiomyomatous uterus noted.     Interventions: recieved IV Ceftriaxone and Flagyl x1, IV morphine 4 mg x1, IV zofran 4 mg x1, 2 L NS. Patient is a 63 y.o. F with PMHx diabetes, breast cancer s/p lumpectomy (right), asthma presenting with abdominal pain, nausea, vomiting and diarrhea that began earlier today. She was constipated for 3 days prior to admission. Takes senna PRN at home.  Abdominal pain is generalized, worst in the LLQ, rated 7/10 at max intensity, tends to improve after bowel movements. She denies any sick contacts, recent travel hx.   Has had multiple similar episodes prior. Self limiting.   Her last colonoscopy was 2 years ago, a polyp was removed, denies any hx of ulcerative colitis or Crohn's disease.     ED course:   VS: T 97.6, HR 81, /69, RR 18, SpO2 94%  Labs: significant for mild leukocytosis of 13, otherwise unremarkable (liver enzymes, lipase, trop, hb/hct all wnl)  CT head: No acute hemorrhage, mass effect or midline shift.  CTAP: No bowel obstruction. Possible areas of colonic wall thickening noted particularly in the proximal descending and distal descending/proximal sigmoid colon. Leiomyomatous uterus noted.     Interventions: recieved IV Ceftriaxone and Flagyl x1, IV morphine 4 mg x1, IV zofran 4 mg x1, 2 L NS. Patient is a 63 y.o. F with PMHx diabetes, breast cancer s/p lumpectomy (right), asthma presenting with abdominal pain, nausea, vomiting and diarrhea that began earlier today. She was constipated for 3 days prior to admission. Takes senna PRN at home.  Abdominal pain is generalized, worst in the LLQ, rated 7/10 at max intensity, tends to improve after bowel movements. She denies any sick contacts, recent travel hx.   Has had multiple similar episodes prior. Self limiting.   Her last colonoscopy was 2 years ago, a polyp was removed, denies any hx of ulcerative colitis or Crohn's disease.     ED course:   VS: T 97.6, HR 81, /69, RR 18, SpO2 94%  Labs: significant for mild leukocytosis of 13, otherwise unremarkable (liver enzymes, lipase, trop, hb/hct all wnl)  CT head: No acute hemorrhage, mass effect or midline shift.  CTAP: No bowel obstruction. Possible areas of colonic wall thickening noted particularly in the proximal descending and distal descending/proximal sigmoid colon. Leiomyomatous uterus noted.     Interventions: received IV Ceftriaxone and Flagyl x1, IV morphine 4 mg x1, IV zofran 4 mg x1, 2 L NS.

## 2024-09-18 NOTE — H&P ADULT - PROBLEM SELECTOR PLAN 2
Home regimen includes metformin 1000 mg, farxiga 10 mg.   - Hold within inpatient setting  - ISS   - q6 fingersticks while unable to tolerate p.o.

## 2024-09-18 NOTE — DISCHARGE NOTE PROVIDER - NSDCCPCAREPLAN_GEN_ALL_CORE_FT
PRINCIPAL DISCHARGE DIAGNOSIS  Diagnosis: Abdominal pain, vomiting, and diarrhea  Assessment and Plan of Treatment: You presented with abdominal pain, nausea and vomiting which subsided upon admission. You were initially put on antbioics for treatment of colitis that was seen on imaging. You had no further episodes of abdominal pain, diarrhea and vomiting and tolerated oral intake before dischage. You stated that you felt ready to go home after eating lunch.   ** Please follow up with your PCP on discharge   ** Please continue to take 500 mg Azithromycin 9/19 - 9/20   ** Please take Zofran as instructed for nausea

## 2024-09-18 NOTE — PATIENT PROFILE ADULT - FALL HARM RISK - HARM RISK INTERVENTIONS

## 2024-09-18 NOTE — PROGRESS NOTE ADULT - SUBJECTIVE AND OBJECTIVE BOX
PROGRESS NOTE:  Location: [address] ([address])  MRN: [id number]    INTERVAL/OVERNIGHT EVENTS:   - No acute events overnight. Patient reported 10x episodes of emesis last night and poor sleep    [x] History per: Patient    [x] There are no updates to the medical, surgical, social or family history unless described:    Review of Systems: History Per: Patient  General: [x] Neg  Pulmonary: [x] Neg  Cardiac: [x] Neg  Gastrointestinal: [x] Diffuse abdominal pain   Musculoskeletal: Right shoulder pain at the level of the scapula     MEDICATIONS  (STANDING):  cefTRIAXone   IVPB 1000 milliGRAM(s) IV Intermittent every 24 hours  enoxaparin Injectable 40 milliGRAM(s) SubCutaneous every 24 hours  influenza  Vaccine (HIGH DOSE) 0.5 milliLiter(s) IntraMuscular once  insulin lispro (ADMELOG) corrective regimen sliding scale   SubCutaneous every 6 hours  lactated ringers. 1000 milliLiter(s) (110 mL/Hr) IV Continuous <Continuous>  metroNIDAZOLE  IVPB 500 milliGRAM(s) IV Intermittent every 12 hours    MEDICATIONS  (PRN):  acetaminophen     Tablet .. 650 milliGRAM(s) Oral every 6 hours PRN Temp greater or equal to 38C (100.4F), Mild Pain (1 - 3)  melatonin 3 milliGRAM(s) Oral at bedtime PRN Insomnia  ondansetron Injectable 4 milliGRAM(s) IV Push every 8 hours PRN Nausea and/or Vomiting    No Known Allergies      PHYSICAL EXAM  Vital Signs Last 24 Hrs  T(C): 36.4 (18 Sep 2024 05:33), Max: 36.8 (17 Sep 2024 17:11)  T(F): 97.5 (18 Sep 2024 05:33), Max: 98.2 (17 Sep 2024 17:11)  HR: 65 (18 Sep 2024 05:33) (57 - 81)  BP: 111/63 (18 Sep 2024 05:33) (111/63 - 167/83)  BP(mean): --  RR: 18 (18 Sep 2024 05:33) (16 - 18)  SpO2: 96% (18 Sep 2024 05:33) (94% - 98%)    Parameters below as of 18 Sep 2024 05:33  Patient On (Oxygen Delivery Method): room air        PATIENT CARE ACCESS DEVICES  [x ] Peripheral IV    Physical Exam:  General: NAD  Pulmonary: No increased WOB, Lungs clear to ascultation   Cardiac: RRR S1,S2 no murmurs rubs and gallops  Abdominal: Non distended diffuse tenderness to palpation increased tenderness and pain on palpation in the LLQ  Extremities: Pain on passive right shoulder abduction      INTERVAL LAB RESULTS:                         15.0   13.29 )-----------( 176      ( 17 Sep 2024 14:26 )             45.3                               141    |  106    |  20                  Calcium: 8.5   / iCa: x      (09-17 @ 14:26)    ----------------------------<  137       Magnesium: x                                4.3     |  26     |  0.64             Phosphorous: x        TPro  7.1    /  Alb  3.7    /  TBili  0.4    /  DBili  x      /  AST  13     /  ALT  26     /  AlkPhos  57     17 Sep 2024 14:26      INTERVAL IMAGING STUDIES:

## 2024-09-18 NOTE — PROGRESS NOTE ADULT - ASSESSMENT
63 year old female with PMH diabetes, breast cancer s/p lumpectomy, asthma admitted for poor po intake in the setting of nausea, diarrhea and vomiting Patient is not in acute distress but continues to endorse 4/10 abdominal pain and 10x episodes of non bloody non bilious vomiting.     Problem 1: Abdominal pain, vomiting, and diarrhea.   Plan: Generalized abdominal pain, most prominent in LLQ. Associated with nonbilious nonbloody emesis still, however hasn't had diarrhea since admission.  Reported 10 episodes of vomiting last night .Has not met SIRS/sepsis criteria.   Has had multiple similar episodes prior that were self limiting.   Her last colonoscopy was 2 years ago, a polyp was removed, denies any hx of ulcerative colitis or Crohn's disease.  CTAP findings without evidence of bowel obstruction. Areas of colonic wall thickening noted in the proximal descending and distal descending/proximal sigmoid colon suggestive of  infectious colitis.   - C/w IVF resuscitation: LR @ 110 cc/hr  - C/w IV Ceftriaxone and Flagyl (9/17- )  - f/u GI PCR  - Zofran PRN (last QtC 456).      Problem 2:  Diabetes Mellitus   Plan: Home regimen Metformin 1000 mg, Farxiga 10 mg  Hold medications due to poor po intake in patient  q6 fingersticks    Problem 3- Tension Type Headache  Plan: Heat CT 9/17 negative for intracranial bleed   Tylenol PRN    Problem 4: Prophylaxis    CC/HR  E; Replete as needed   N: Will Advance clear liquid diet  DVT: Lovenox   63 year old female with PMH diabetes, breast cancer s/p lumpectomy, asthma admitted for poor po intake in the setting of nausea, diarrhea and vomiting   Patient is not in acute distress but continues to endorse 4/10 abdominal pain and 10x episodes of non bloody non bilious vomiting.     Problem 1: Abdominal pain, vomiting, and diarrhea.   Plan: Generalized abdominal pain, most prominent in LLQ. Associated with nonbilious nonbloody emesis still, however hasn't had diarrhea since admission.  Reported 10 episodes of vomiting last night .Has not met SIRS/sepsis criteria.   Has had multiple similar episodes prior that were self limiting.   Her last colonoscopy was 2 years ago, a polyp was removed, denies any hx of ulcerative colitis or Crohn's disease.  CTAP findings without evidence of bowel obstruction. Areas of colonic wall thickening noted in the proximal descending and distal descending/proximal sigmoid colon suggestive of  infectious colitis.   - C/w IVF resuscitation: LR @ 110 cc/hr  - C/w IV Ceftriaxone and Flagyl (9/17- )  - f/u GI PCR  - Zofran PRN (last QtC 456).      Problem 2:  Diabetes Mellitus   Plan: Home regimen Metformin 1000 mg, Farxiga 10 mg  Hold medications due to poor po intake in patient  q6 fingersticks    Problem 3- Tension Type Headache  Plan: Heat CT 9/17 negative for intracranial bleed   Tylenol PRN    Problem 4: Prophylaxis    CC/HR  E; Replete as needed   N: Will Advance clear liquid diet  DVT: Lovenox   63 year old female with PMH diabetes, breast cancer s/p lumpectomy, asthma admitted for poor po intake in the setting of nausea, diarrhea and vomiting likely in the setting of infectious collitis   Patient is not in acute distress but continues to endorse 4/10 abdominal pain and 10x episodes of non bloody non bilious vomiting.     Problem 1: Abdominal pain, vomiting, and diarrhea.   Plan: Generalized abdominal pain, most prominent in LLQ. Associated with nonbilious nonbloody emesis still, however hasn't had diarrhea since admission.  Reported 10 episodes of vomiting last night .Has not met SIRS/sepsis criteria.   Has had multiple similar episodes prior that were self limiting.   Her last colonoscopy was 2 years ago, a polyp was removed, denies any hx of ulcerative colitis or Crohn's disease.  CTAP findings without evidence of bowel obstruction. Areas of colonic wall thickening noted in the proximal descending and distal descending/proximal sigmoid colon suggestive of  infectious colitis.   - C/w IVF resuscitation: LR @ 110 cc/hr  - C/w IV Ceftriaxone and Flagyl (9/17- )  - f/u GI PCR  - Zofran PRN (last QtC 456).      Problem 2:  Diabetes Mellitus   Plan: Home regimen Metformin 1000 mg, Farxiga 10 mg  Hold medications due to poor po intake in patient  q6 fingersticks    Problem 3- Tension Type Headache  Plan: Heat CT 9/17 negative for intracranial bleed   Tylenol PRN    Problem 4: Prophylaxis    CC/HR  E; Replete as needed   N: Will Advance clear liquid diet  DVT: Lovenox

## 2024-09-18 NOTE — DISCHARGE NOTE NURSING/CASE MANAGEMENT/SOCIAL WORK - PATIENT PORTAL LINK FT
You can access the FollowMyHealth Patient Portal offered by [facility] by registering at the following website: [email or web address]. By joining [employer]’s FollowMyHealth portal, you will also be able to view your health information using other applications (apps) compatible with our system.

## 2024-09-18 NOTE — DISCHARGE NOTE PROVIDER - NSDCMRMEDTOKEN_GEN_ALL_CORE_FT
acetaminophen 325 mg oral tablet: 2 tab(s) orally every 4 hours as needed for pain or fever  azithromycin 500 mg oral tablet: 1 tab(s) orally once a day Take 9/19 - 9/20  cefpodoxime 200 mg oral tablet: 1 tab(s) orally every 12 hours x 7 days  metroNIDAZOLE 500 mg oral tablet: 1 tab(s) orally 3 times a day x 7 days   acetaminophen 325 mg oral tablet: 2 tab(s) orally every 4 hours as needed for pain or fever  azithromycin 500 mg oral tablet: 1 tab(s) orally once a day Take  -   cefpodoxime 200 mg oral tablet: 1 tab(s) orally every 12 hours x 7 days  metroNIDAZOLE 500 mg oral tablet: 1 tab(s) orally 3 times a day x 7 days  ondansetron 4 mg oral tablet, disintegratin tab(s) orally prn Please take 1 tab as needed for nausea every 8hours.   azithromycin 500 mg oral tablet: 1 tab(s) orally once a day Take  -   ondansetron 4 mg oral tablet, disintegratin tab(s) orally prn Please take 1 tab as needed for nausea every 8hours.   azithromycin 500 mg oral tablet: 1 tab(s) orally once a day Take  -   Farxiga 10 mg oral tablet: 1 tab(s) orally once a day  metFORMIN 1000 mg oral tablet, extended release: 1 tab(s) orally 2 times a day  ondansetron 4 mg oral tablet, disintegratin tab(s) orally prn Please take 1 tab as needed for nausea every 8hours.  Senna 8.6 mg oral tablet: 1 tab(s) orally once a day

## 2024-09-24 DIAGNOSIS — E86.0 DEHYDRATION: ICD-10-CM

## 2024-09-24 DIAGNOSIS — J45.909 UNSPECIFIED ASTHMA, UNCOMPLICATED: ICD-10-CM

## 2024-09-24 DIAGNOSIS — G44.209 TENSION-TYPE HEADACHE, UNSPECIFIED, NOT INTRACTABLE: ICD-10-CM

## 2024-09-24 DIAGNOSIS — E11.9 TYPE 2 DIABETES MELLITUS WITHOUT COMPLICATIONS: ICD-10-CM

## 2024-09-24 DIAGNOSIS — I10 ESSENTIAL (PRIMARY) HYPERTENSION: ICD-10-CM

## 2024-09-24 DIAGNOSIS — A09 INFECTIOUS GASTROENTERITIS AND COLITIS, UNSPECIFIED: ICD-10-CM

## 2024-09-24 DIAGNOSIS — Z85.3 PERSONAL HISTORY OF MALIGNANT NEOPLASM OF BREAST: ICD-10-CM

## 2024-09-29 PROCEDURE — 82962 GLUCOSE BLOOD TEST: CPT

## 2024-09-29 PROCEDURE — 36415 COLL VENOUS BLD VENIPUNCTURE: CPT

## 2024-09-29 PROCEDURE — 86901 BLOOD TYPING SEROLOGIC RH(D): CPT

## 2024-09-29 PROCEDURE — 83605 ASSAY OF LACTIC ACID: CPT

## 2024-09-29 PROCEDURE — 80053 COMPREHEN METABOLIC PANEL: CPT

## 2024-09-29 PROCEDURE — 96375 TX/PRO/DX INJ NEW DRUG ADDON: CPT

## 2024-09-29 PROCEDURE — 70450 CT HEAD/BRAIN W/O DYE: CPT | Mod: MC

## 2024-09-29 PROCEDURE — 74177 CT ABD & PELVIS W/CONTRAST: CPT | Mod: MC

## 2024-09-29 PROCEDURE — 84100 ASSAY OF PHOSPHORUS: CPT

## 2024-09-29 PROCEDURE — 85025 COMPLETE CBC W/AUTO DIFF WBC: CPT

## 2024-09-29 PROCEDURE — 73030 X-RAY EXAM OF SHOULDER: CPT

## 2024-09-29 PROCEDURE — 86900 BLOOD TYPING SEROLOGIC ABO: CPT

## 2024-09-29 PROCEDURE — 84484 ASSAY OF TROPONIN QUANT: CPT

## 2024-09-29 PROCEDURE — 96374 THER/PROPH/DIAG INJ IV PUSH: CPT

## 2024-09-29 PROCEDURE — 86850 RBC ANTIBODY SCREEN: CPT

## 2024-09-29 PROCEDURE — 83690 ASSAY OF LIPASE: CPT

## 2024-09-29 PROCEDURE — 99285 EMERGENCY DEPT VISIT HI MDM: CPT

## 2024-09-29 PROCEDURE — 71045 X-RAY EXAM CHEST 1 VIEW: CPT

## 2024-09-29 PROCEDURE — 83036 HEMOGLOBIN GLYCOSYLATED A1C: CPT

## 2024-09-29 PROCEDURE — 83735 ASSAY OF MAGNESIUM: CPT
